# Patient Record
Sex: FEMALE | URBAN - METROPOLITAN AREA
[De-identification: names, ages, dates, MRNs, and addresses within clinical notes are randomized per-mention and may not be internally consistent; named-entity substitution may affect disease eponyms.]

---

## 2022-06-28 ENCOUNTER — ACNE/ROSACEA (OUTPATIENT)
Dept: URBAN - METROPOLITAN AREA CLINIC 36 | Facility: CLINIC | Age: 37
Setting detail: DERMATOLOGY
End: 2022-06-28

## 2022-06-28 ENCOUNTER — RX ONLY (RX ONLY)
Age: 37
End: 2022-06-28

## 2022-06-28 DIAGNOSIS — D48.5 NEOPLASM OF UNCERTAIN BEHAVIOR OF SKIN: ICD-10-CM

## 2022-06-28 PROBLEM — L82.1 OTHER SEBORRHEIC KERATOSIS: Status: ACTIVE | Noted: 2022-06-28

## 2022-06-28 PROBLEM — L82.1 OTHER SEBORRHEIC KERATOSIS: Status: RESOLVED | Noted: 2022-06-28

## 2022-06-28 PROCEDURE — 99204 OFFICE O/P NEW MOD 45 MIN: CPT

## 2022-06-28 RX ORDER — TRETINOIN 0.25 MG/G
A SMALL AMOUNT CREAM TOPICAL EVERY NIGHT
Qty: 45 | Refills: 2
Start: 2022-06-28

## 2024-06-05 ENCOUNTER — LAB OUTSIDE AN ENCOUNTER (OUTPATIENT)
Dept: URBAN - METROPOLITAN AREA CLINIC 23 | Facility: CLINIC | Age: 39
End: 2024-06-05

## 2024-06-05 ENCOUNTER — DASHBOARD ENCOUNTERS (OUTPATIENT)
Age: 39
End: 2024-06-05

## 2024-06-05 ENCOUNTER — OFFICE VISIT (OUTPATIENT)
Dept: URBAN - METROPOLITAN AREA CLINIC 23 | Facility: CLINIC | Age: 39
End: 2024-06-05
Payer: COMMERCIAL

## 2024-06-05 VITALS
HEIGHT: 62 IN | SYSTOLIC BLOOD PRESSURE: 93 MMHG | DIASTOLIC BLOOD PRESSURE: 60 MMHG | HEART RATE: 68 BPM | WEIGHT: 136 LBS | BODY MASS INDEX: 25.03 KG/M2 | TEMPERATURE: 97.9 F

## 2024-06-05 DIAGNOSIS — R10.11 RUQ ABDOMINAL PAIN: ICD-10-CM

## 2024-06-05 PROCEDURE — 99213 OFFICE O/P EST LOW 20 MIN: CPT | Performed by: INTERNAL MEDICINE

## 2024-06-05 RX ORDER — ESZOPICLONE 2 MG/1
1 TABLET IMMEDIATELY BEFORE BEDTIME TABLET, COATED ORAL ONCE A DAY
Status: ACTIVE | COMMUNITY

## 2024-06-05 RX ORDER — LAMOTRIGINE 25 MG/1
1 TABLET TABLET ORAL
Status: ACTIVE | COMMUNITY

## 2024-06-05 RX ORDER — LEVOTHYROXINE SODIUM 25 UG/1
1 TABLET IN THE MORNING ON AN EMPTY STOMACH TABLET ORAL ONCE A DAY
Status: ACTIVE | COMMUNITY

## 2024-06-05 NOTE — HPI-TODAY'S VISIT:
38F US abd--normal GB . c/o RUQ pain for 1 month Labs--normal LFTs. low Hb at 11.1 (LLN 11.2) due to thalassaemia pain is on and off no assoc N/V.  sometimes worse with eating heavy foods no abd surgery wt stable. good appetite BM everyday.  No FH of GI cancers

## 2024-07-09 ENCOUNTER — OFFICE VISIT (OUTPATIENT)
Dept: URBAN - METROPOLITAN AREA CLINIC 92 | Facility: CLINIC | Age: 39
End: 2024-07-09

## 2024-12-05 ENCOUNTER — OFFICE VISIT (OUTPATIENT)
Dept: URBAN - METROPOLITAN AREA CLINIC 37 | Facility: CLINIC | Age: 39
End: 2024-12-05

## 2024-12-20 ENCOUNTER — OFFICE VISIT (OUTPATIENT)
Dept: URBAN - METROPOLITAN AREA CLINIC 35 | Facility: CLINIC | Age: 39
End: 2024-12-20

## 2024-12-20 VITALS
WEIGHT: 128 LBS | HEART RATE: 61 BPM | BODY MASS INDEX: 23.55 KG/M2 | OXYGEN SATURATION: 99 % | HEIGHT: 62 IN | SYSTOLIC BLOOD PRESSURE: 100 MMHG | DIASTOLIC BLOOD PRESSURE: 64 MMHG

## 2024-12-20 DIAGNOSIS — R19.7 DIARRHEA OF PRESUMED INFECTIOUS ORIGIN: ICD-10-CM

## 2024-12-20 DIAGNOSIS — R10.30 LOWER ABDOMINAL PAIN: ICD-10-CM

## 2024-12-20 DIAGNOSIS — R11.0 NAUSEA: ICD-10-CM

## 2024-12-20 DIAGNOSIS — R19.4 CHANGE IN BOWEL HABITS: ICD-10-CM

## 2024-12-20 PROBLEM — 422587007: Status: ACTIVE | Noted: 2024-12-20

## 2024-12-20 PROBLEM — 54586004: Status: ACTIVE | Noted: 2024-12-20

## 2024-12-20 PROBLEM — 88111009: Status: ACTIVE | Noted: 2024-12-20

## 2024-12-20 PROBLEM — 409966000: Status: ACTIVE | Noted: 2024-12-20

## 2024-12-20 PROCEDURE — 99204 OFFICE O/P NEW MOD 45 MIN: CPT | Performed by: INTERNAL MEDICINE

## 2024-12-20 PROCEDURE — 99214 OFFICE O/P EST MOD 30 MIN: CPT | Performed by: INTERNAL MEDICINE

## 2024-12-20 RX ORDER — LEVOTHYROXINE SODIUM 50 UG/1
1 TABLET IN THE MORNING ON AN EMPTY STOMACH TABLET ORAL ONCE A DAY
Status: ACTIVE | COMMUNITY

## 2024-12-20 RX ORDER — LAMOTRIGINE 150 MG/1
1 TABLET TABLET ORAL ONCE A DAY
Status: ACTIVE | COMMUNITY

## 2024-12-20 RX ORDER — DICYCLOMINE HYDROCHLORIDE 20 MG/1
1 TABLET TABLET ORAL THREE TIMES A DAY
Qty: 60 TABLET | Refills: 1 | OUTPATIENT
Start: 2024-12-20 | End: 2025-01-29

## 2024-12-20 RX ORDER — ESZOPICLONE 2 MG/1
1 TABLET IMMEDIATELY BEFORE BEDTIME TABLET, COATED ORAL ONCE A DAY
COMMUNITY

## 2024-12-20 NOTE — HPI-ABNORMAL FINDINGS
39 year old female patient presents for consult of abnormal imaging. She has previously seen Dr. Sanchez and Dr. Garvey for issue. She admits she started experiencing RUQ pain in May. Dr. Garvey ordered imaging and recommeded she follow a low fat, bland diet. She admits maintaining diet. She admits symptoms have been gradually worsening. She states she treid a bile acid supplement OTC for two weeks, but has stopped last week. Describes symptoms as intermittent dull cramp. Admits some nausea. Denies vomiting. Admits some associated weight loss, ~5lbs in three months.  OUTSIDE IMAGING  MRCP (12/18/2024) IMPRESSION: Unremarkable MRCP with no evidence for biliary filling defect or obstruction. Subcentimeter renal cysts  US ABD (11/27/2024) IMPRESSION: Echogenic bilateral renal medullae, which may represent sequelae of medullary nephrocalcinosis. No hydronephrosis.  HIDA SCAN (06/14/2024) - IMPRESSION: No evidence of cystic duct obstruction. Abnormally low gallbladder ejection fraction of 12%. This represents gallbladder dyskinesia.  US ABD (05/22/2024) - IMPRESSION: Slightly echogenic medullary spaces of the kidneys suspicious for medullary nephrocalcinosis. No hydronephrosis. Otherwise unremarkable ultrasound abdomen.

## 2024-12-20 NOTE — HPI-CHANGE IN BOWEL HABITS
39 year old female patient presents for change in bowel habits consult. She admits symptoms began at the beginning of December. She states her partner had returned from [place] and have symptoms of gastroenteritis which she then started experiencing herself. She denies getting treatment for symptoms. States that symptoms are not as severe since the beginning of the month, but lingering. She admits bowel movements have been variable. During a flare up with intense lower abdominal pain, she would have 3-4 bowel movements day with mostly loose, watery stools. Normally, she goes every other day with normal stools. States stool color has been variable with more stools appearing more yellow-pale in color. Denies taking recent antibiotics. Denies recent changes to medication. Denies any recent stools studies. She denies blood in stools, melena , or mucus. Denies previous colonoscopy.  OUTSIDE LABS (09/25/2024) Lipid Panel  HDL - 46 L All other values WNL  CMP Creatinine - 0.99 H All other values WNL  TSH - WNL  CBC w/DIFF/PLT RBC - 5.43 H MCV - 73.8 L MCH - 22.3 L MCHC - 30.2 L RDW - 19.1 H All other values WNL  Ferritin - 13 L  (06/05/2024) CBC w/DIFF/PLT Hgb - 11.0 L MCV - 74 L MCH - 22.8 L MCHC - 30.7 L RDW - 20.5 H All other values WNL  CMP - WNL Amylase - WNL Lipase - WNL

## 2024-12-20 NOTE — HPI-ABDOMINAL PAIN
39 year old female patient presents for abdominal pain consult. She admits she has been experiencing symptoms for a week. Describes symptoms as occasional sharp, or dull, primarily cramping. States symptoms are located lower abdomen. She admits symptoms occur sporadically. Admits some nausea. Denies vomiting. Admits having any recent imaging. Denies EGD in the past.

## 2024-12-30 LAB
CAMPYLOBACTER SPP. AG,EIA: (no result)
SALMONELLA AND SHIGELLA, CULTURE: (no result)
SHIGA TOXINS, EIA W/RFL TO E.COLI O157 CULTURE: (no result)

## 2025-01-10 ENCOUNTER — OFFICE VISIT (OUTPATIENT)
Dept: URBAN - METROPOLITAN AREA TELEHEALTH 2 | Facility: TELEHEALTH | Age: 40
End: 2025-01-10
Payer: COMMERCIAL

## 2025-01-10 DIAGNOSIS — R10.11 RUQ ABDOMINAL PAIN: ICD-10-CM

## 2025-01-10 PROCEDURE — 99204 OFFICE O/P NEW MOD 45 MIN: CPT | Performed by: INTERNAL MEDICINE

## 2025-01-10 PROCEDURE — 98006 SYNCH AUDIO-VIDEO EST MOD 30: CPT | Performed by: INTERNAL MEDICINE

## 2025-01-10 RX ORDER — OMEPRAZOLE 40 MG/1
1 CAPSULE 30 MINUTES BEFORE MORNING MEAL CAPSULE, DELAYED RELEASE ORAL ONCE A DAY
Qty: 90 | Refills: 3 | OUTPATIENT
Start: 2025-01-10

## 2025-01-10 RX ORDER — ESZOPICLONE 2 MG/1
1 TABLET IMMEDIATELY BEFORE BEDTIME TABLET, COATED ORAL ONCE A DAY
COMMUNITY

## 2025-01-10 RX ORDER — LAMOTRIGINE 150 MG/1
1 TABLET TABLET ORAL ONCE A DAY
Status: ACTIVE | COMMUNITY

## 2025-01-10 RX ORDER — DICYCLOMINE HYDROCHLORIDE 20 MG/1
1 TABLET TABLET ORAL THREE TIMES A DAY
Qty: 60 TABLET | Refills: 1 | Status: ACTIVE | COMMUNITY
Start: 2024-12-20 | End: 2025-01-29

## 2025-01-10 RX ORDER — LEVOTHYROXINE SODIUM 50 UG/1
1 TABLET IN THE MORNING ON AN EMPTY STOMACH TABLET ORAL ONCE A DAY
Status: ACTIVE | COMMUNITY

## 2025-01-10 NOTE — HPI-TODAY'S VISIT:
This is a 39-year-old female who now presents for a follow-up.  Dr. Tito Howell recently saw her for gastroenteritis. Her symptoms began in December after she traveled to [place] and had gastroenteritis, which has continued to persist. She reports having lower abdominal pain characterized as crampy abdominal pain with loose stools 3-4 times per day without overt bleeding. There was no fever, chills, or constitutional symptoms, including unintentional weight loss. Stool studies on 12/20/2024 excluded Campylobacter, Shigella, and E. coli.  Her symptoms resolved spontaneously but she continues to have bloating in constipation.    She is referred by Dr. Frantz Garvey and Santiago Sanchez for RUQ abdominal pain and an evaluation for sphincter of Oddi dysfunction.  She underwent an abdominal ultrasound on 05/22/2024 that showed slight echogenic space in the kidney, suspicious of medullary nephrocalcinosis.  Otherwise, the exam was unremarkable.  HIDA scan on 06/14/2024 excluded cystic obstruction but an abnormal ejection fraction of 12% consistent with biliary dyskinesia.  Repeat abdominal ultrasound on 11/27/2024 again demonstrated bilateral echogenic renal medullae suggestive of medullary nephrocalcinosis.  MRCP on 12/18/2024 was unremarkable.  She reports having persistent RUQ abdominal pain that is exacerbated with fatty meals.  Initially, her symptoms were intermittent, but now she has worsening symptoms even with the slightest amount of fatty meals.

## 2025-01-17 ENCOUNTER — OFFICE VISIT (OUTPATIENT)
Dept: URBAN - METROPOLITAN AREA CLINIC 35 | Facility: CLINIC | Age: 40
End: 2025-01-17

## 2025-02-06 ENCOUNTER — CLAIMS CREATED FROM THE CLAIM WINDOW (OUTPATIENT)
Dept: URBAN - METROPOLITAN AREA SURGERY CENTER 16 | Facility: SURGERY CENTER | Age: 40
End: 2025-02-06
Payer: COMMERCIAL

## 2025-02-06 ENCOUNTER — CLAIMS CREATED FROM THE CLAIM WINDOW (OUTPATIENT)
Dept: URBAN - METROPOLITAN AREA CLINIC 4 | Facility: CLINIC | Age: 40
End: 2025-02-06
Payer: COMMERCIAL

## 2025-02-06 ENCOUNTER — TELEPHONE ENCOUNTER (OUTPATIENT)
Dept: URBAN - METROPOLITAN AREA CLINIC 92 | Facility: CLINIC | Age: 40
End: 2025-02-06

## 2025-02-06 DIAGNOSIS — R10.11 ABDOMINAL BURNING SENSATION IN RIGHT UPPER QUADRANT: ICD-10-CM

## 2025-02-06 DIAGNOSIS — K31.89 OTHER DISEASES OF STOMACH AND DUODENUM: ICD-10-CM

## 2025-02-06 DIAGNOSIS — K29.70 GASTRITIS, UNSPECIFIED, WITHOUT BLEEDING: ICD-10-CM

## 2025-02-06 DIAGNOSIS — K29.70 GASTRITIS: ICD-10-CM

## 2025-02-06 DIAGNOSIS — K21.9 GASTRIC REFLUX: ICD-10-CM

## 2025-02-06 DIAGNOSIS — K29.50 ANTRAL GASTRITIS: ICD-10-CM

## 2025-02-06 PROBLEM — 1086791000119100: Status: ACTIVE | Noted: 2025-02-06

## 2025-02-06 PROCEDURE — 00731 ANES UPR GI NDSC PX NOS: CPT | Performed by: ANESTHESIOLOGIST ASSISTANT

## 2025-02-06 PROCEDURE — 43239 EGD BIOPSY SINGLE/MULTIPLE: CPT | Performed by: INTERNAL MEDICINE

## 2025-02-06 PROCEDURE — 88342 IMHCHEM/IMCYTCHM 1ST ANTB: CPT | Performed by: PATHOLOGY

## 2025-02-06 PROCEDURE — 88305 TISSUE EXAM BY PATHOLOGIST: CPT | Performed by: PATHOLOGY

## 2025-02-06 PROCEDURE — 00731 ANES UPR GI NDSC PX NOS: CPT | Performed by: ANESTHESIOLOGY

## 2025-02-06 RX ORDER — LAMOTRIGINE 150 MG/1
1 TABLET TABLET ORAL ONCE A DAY
Status: ACTIVE | COMMUNITY

## 2025-02-06 RX ORDER — ESZOPICLONE 2 MG/1
1 TABLET IMMEDIATELY BEFORE BEDTIME TABLET, COATED ORAL ONCE A DAY
COMMUNITY

## 2025-02-06 RX ORDER — LEVOTHYROXINE SODIUM 50 UG/1
1 TABLET IN THE MORNING ON AN EMPTY STOMACH TABLET ORAL ONCE A DAY
Status: ACTIVE | COMMUNITY

## 2025-02-06 RX ORDER — OMEPRAZOLE 40 MG/1
1 CAPSULE 30 MINUTES BEFORE MEAL CAPSULE, DELAYED RELEASE ORAL TWICE DAILY
Qty: 60 | Refills: 1 | OUTPATIENT
Start: 2025-02-06

## 2025-02-06 RX ORDER — OMEPRAZOLE 40 MG/1
1 CAPSULE 30 MINUTES BEFORE MORNING MEAL CAPSULE, DELAYED RELEASE ORAL ONCE A DAY
Qty: 90 | Refills: 3 | Status: ACTIVE | COMMUNITY
Start: 2025-01-10

## 2025-02-10 ENCOUNTER — WEB ENCOUNTER (OUTPATIENT)
Dept: URBAN - METROPOLITAN AREA CLINIC 92 | Facility: CLINIC | Age: 40
End: 2025-02-10

## 2025-02-19 ENCOUNTER — TELEPHONE ENCOUNTER (OUTPATIENT)
Dept: URBAN - METROPOLITAN AREA CLINIC 35 | Facility: CLINIC | Age: 40
End: 2025-02-19

## 2025-02-26 ENCOUNTER — TELEPHONE ENCOUNTER (OUTPATIENT)
Dept: URBAN - METROPOLITAN AREA CLINIC 92 | Facility: CLINIC | Age: 40
End: 2025-02-26

## 2025-03-17 ENCOUNTER — TELEPHONE ENCOUNTER (OUTPATIENT)
Dept: URBAN - METROPOLITAN AREA CLINIC 92 | Facility: CLINIC | Age: 40
End: 2025-03-17

## 2025-03-17 ENCOUNTER — WEB ENCOUNTER (OUTPATIENT)
Dept: URBAN - METROPOLITAN AREA CLINIC 92 | Facility: CLINIC | Age: 40
End: 2025-03-17

## 2025-03-17 RX ORDER — RIFAXIMIN 550 MG/1
1 TABLET TABLET ORAL THREE TIMES A DAY
Qty: 42 TABLET | Refills: 0 | OUTPATIENT
Start: 2025-03-17 | End: 2025-03-31

## 2025-03-18 ENCOUNTER — WEB ENCOUNTER (OUTPATIENT)
Dept: URBAN - METROPOLITAN AREA CLINIC 92 | Facility: CLINIC | Age: 40
End: 2025-03-18

## 2025-05-15 ENCOUNTER — WEB ENCOUNTER (OUTPATIENT)
Dept: URBAN - METROPOLITAN AREA CLINIC 92 | Facility: CLINIC | Age: 40
End: 2025-05-15